# Patient Record
Sex: FEMALE | URBAN - METROPOLITAN AREA
[De-identification: names, ages, dates, MRNs, and addresses within clinical notes are randomized per-mention and may not be internally consistent; named-entity substitution may affect disease eponyms.]

---

## 2021-01-14 ENCOUNTER — PATIENT OUTREACH (OUTPATIENT)
Dept: OTHER | Age: 56
End: 2021-01-14

## 2021-01-14 NOTE — PROGRESS NOTES
Patient identified for recent hospital stay on benefits eligible for Care Management;   Current clinical information provided by MMO liaison and MR as :  61 yo female with diagnosis of cellulitis, wound location unknown, and s/p recent shingles; Admitted to [facility] on 2021;  Patient with large area of cellulitis with full thickness wounds with small amount of serosanguinous drainage; Wound cultures: heavy growth of staph aureus, many gram+ cocci in pairs; Blood Cultures:  gram positive cocci in clusters: staph aureus;    2021:  98.7, 105-126, 104/50, 90% RA      glucose 120, creat 0.52, ca 8.4, WBC 11.8, Hgb 9.1  2021:  98.6F, 106-114, 128/61, 88% RA    Creat 0.48, WBC 11.6, Hgb 10.0  .  DC to home on 2021, with PICC Line and order for IV Ancef Q8hr via PICC for 4 weeks; [facility] [id number] [facility], verified  and zip code as identifiers. Provided introduction and explanation of the Nurse Care Manager role. Patient did not have DC instructions in front of her and did not want to go through them at this time;  Patient has scheduled FU appointment with wound care clinic tomorrow, has Home health agency teaching for IV antibiotic infusion, spouse is helping with infusions daily;  [facility] nurse is providing maintenance of PICC line and dressing; Does not know names of [facility] agency or infusion company providing antibiotics, but did have her initial dose last night and started with one this AM at 0600.     Declined support or assistance with additional outreach call and asked to go from call;